# Patient Record
Sex: FEMALE | Race: OTHER | HISPANIC OR LATINO
[De-identification: names, ages, dates, MRNs, and addresses within clinical notes are randomized per-mention and may not be internally consistent; named-entity substitution may affect disease eponyms.]

---

## 2019-01-19 PROBLEM — Z00.00 ENCOUNTER FOR PREVENTIVE HEALTH EXAMINATION: Status: ACTIVE | Noted: 2019-01-19

## 2019-02-04 ENCOUNTER — LABORATORY RESULT (OUTPATIENT)
Age: 79
End: 2019-02-04

## 2019-02-05 ENCOUNTER — APPOINTMENT (OUTPATIENT)
Dept: OBGYN | Facility: CLINIC | Age: 79
End: 2019-02-05
Payer: MEDICARE

## 2019-02-05 VITALS — DIASTOLIC BLOOD PRESSURE: 80 MMHG | WEIGHT: 147 LBS | SYSTOLIC BLOOD PRESSURE: 130 MMHG

## 2019-02-05 DIAGNOSIS — I10 ESSENTIAL (PRIMARY) HYPERTENSION: ICD-10-CM

## 2019-02-05 DIAGNOSIS — M19.90 UNSPECIFIED OSTEOARTHRITIS, UNSPECIFIED SITE: ICD-10-CM

## 2019-02-05 PROCEDURE — 99202 OFFICE O/P NEW SF 15 MIN: CPT | Mod: 25

## 2019-02-05 PROCEDURE — 99387 INIT PM E/M NEW PAT 65+ YRS: CPT

## 2019-02-05 NOTE — PHYSICAL EXAM
[Normal] : urethra [No Bleeding] : there was no active vaginal bleeding [Absent] : absent [Uterine Adnexae] : were not tender and not enlarged [FreeTextEntry4] : vaginal prolapse

## 2019-02-05 NOTE — COUNSELING
[Breast Self Exam] : breast self exam [Nutrition] : nutrition [Exercise] : exercise [Vitamins/Supplements] : vitamins/supplements [STD (testing, results, tx)] : STD (testing, results, tx) [Vaccines] : vaccines

## 2019-02-27 ENCOUNTER — APPOINTMENT (OUTPATIENT)
Dept: OBGYN | Facility: CLINIC | Age: 79
End: 2019-02-27

## 2019-09-09 ENCOUNTER — APPOINTMENT (OUTPATIENT)
Dept: UROLOGY | Facility: CLINIC | Age: 79
End: 2019-09-09
Payer: MEDICARE

## 2019-09-09 VITALS
DIASTOLIC BLOOD PRESSURE: 85 MMHG | HEIGHT: 64.5 IN | RESPIRATION RATE: 16 BRPM | WEIGHT: 153 LBS | HEART RATE: 83 BPM | BODY MASS INDEX: 25.8 KG/M2 | SYSTOLIC BLOOD PRESSURE: 145 MMHG

## 2019-09-09 PROCEDURE — 99204 OFFICE O/P NEW MOD 45 MIN: CPT

## 2019-09-09 NOTE — REVIEW OF SYSTEMS
[see HPI] : see HPI [Blood in urine that you can see] : blood visible in urine [Urine Infection (bladder/kidney)] : bladder/kidney infection [Negative] : Heme/Lymph

## 2019-09-11 RX ORDER — GLIMEPIRIDE 4 MG/1
TABLET ORAL
Refills: 0 | Status: ACTIVE | COMMUNITY

## 2019-09-11 NOTE — HISTORY OF PRESENT ILLNESS
[FreeTextEntry1] : 80 yo F presents with history of gross hematuria a few days ago\par Was also having some urinary frequency\par Went to ER and given abx = macrobid\par no significant LUTS anymore\par no dysuria\par no recurrent UTI\par history of prolapse s/p surgery in 2015 with urogyn\par

## 2019-09-11 NOTE — ASSESSMENT
[FreeTextEntry1] : 80 yo F with gross hematuria, possible UTI\par \par - UA, culture\par - Discussed possible etiologies for hematuria including benign (UTI, nephrolithiasis, cyst, BPH) vs malignancy (renal, ureteral and bladder). Discussed hematuria workup which includes CT urogram and cystoscopy. Discussed risk and benefits of cystoscopy.\par - For now, will proceed with CT urogram and consider cysto once results reviewed\par

## 2019-09-12 LAB
APPEARANCE: ABNORMAL
BACTERIA UR CULT: ABNORMAL
BACTERIA: ABNORMAL
BILIRUBIN URINE: NEGATIVE
BLOOD URINE: ABNORMAL
COLOR: YELLOW
GLUCOSE QUALITATIVE U: NEGATIVE
HYALINE CASTS: 0 /LPF
KETONES URINE: NEGATIVE
LEUKOCYTE ESTERASE URINE: ABNORMAL
MICROSCOPIC-UA: NORMAL
NITRITE URINE: POSITIVE
PH URINE: 5.5
PROTEIN URINE: NORMAL
RED BLOOD CELLS URINE: 74 /HPF
SPECIFIC GRAVITY URINE: 1.02
SQUAMOUS EPITHELIAL CELLS: 1 /HPF
UROBILINOGEN URINE: NORMAL
WHITE BLOOD CELLS URINE: 167 /HPF

## 2019-09-12 RX ORDER — CIPROFLOXACIN HYDROCHLORIDE 500 MG/1
500 TABLET, FILM COATED ORAL
Qty: 14 | Refills: 0 | Status: ACTIVE | COMMUNITY
Start: 2019-09-12 | End: 1900-01-01

## 2019-09-13 ENCOUNTER — RESULT REVIEW (OUTPATIENT)
Age: 79
End: 2019-09-13

## 2019-10-01 ENCOUNTER — FORM ENCOUNTER (OUTPATIENT)
Age: 79
End: 2019-10-01

## 2019-10-02 ENCOUNTER — OUTPATIENT (OUTPATIENT)
Dept: OUTPATIENT SERVICES | Facility: HOSPITAL | Age: 79
LOS: 1 days | End: 2019-10-02
Payer: COMMERCIAL

## 2019-10-02 ENCOUNTER — APPOINTMENT (OUTPATIENT)
Dept: CT IMAGING | Facility: HOSPITAL | Age: 79
End: 2019-10-02
Payer: MEDICARE

## 2019-10-02 DIAGNOSIS — R31.0 GROSS HEMATURIA: ICD-10-CM

## 2019-10-02 PROCEDURE — 74178 CT ABD&PLV WO CNTR FLWD CNTR: CPT | Mod: 26

## 2019-10-02 PROCEDURE — 74178 CT ABD&PLV WO CNTR FLWD CNTR: CPT

## 2020-01-13 ENCOUNTER — APPOINTMENT (OUTPATIENT)
Dept: UROLOGY | Facility: CLINIC | Age: 80
End: 2020-01-13
Payer: MEDICARE

## 2020-01-13 VITALS
HEART RATE: 85 BPM | WEIGHT: 153 LBS | HEIGHT: 64 IN | DIASTOLIC BLOOD PRESSURE: 86 MMHG | SYSTOLIC BLOOD PRESSURE: 136 MMHG | BODY MASS INDEX: 26.12 KG/M2

## 2020-01-13 DIAGNOSIS — N39.0 URINARY TRACT INFECTION, SITE NOT SPECIFIED: ICD-10-CM

## 2020-01-13 PROCEDURE — 99213 OFFICE O/P EST LOW 20 MIN: CPT

## 2020-01-19 NOTE — ASSESSMENT
[FreeTextEntry1] : 78 yo F with history of recurrent UTI, LUTS history. Doing well\par \par - Reaffirmed behavioral modifications\par - FU in 6 months\par \par

## 2020-01-19 NOTE — PHYSICAL EXAM
[General Appearance - Well Nourished] : well nourished [General Appearance - Well Developed] : well developed [Normal Appearance] : normal appearance [General Appearance - In No Acute Distress] : no acute distress [Well Groomed] : well groomed [Urinary Bladder Findings] : the bladder was normal on palpation [Costovertebral Angle Tenderness] : no ~M costovertebral angle tenderness [Abdomen Tenderness] : non-tender [Abdomen Soft] : soft [Edema] : no peripheral edema [Exaggerated Use Of Accessory Muscles For Inspiration] : no accessory muscle use [] : no respiratory distress [Respiration, Rhythm And Depth] : normal respiratory rhythm and effort [Mood] : the mood was normal [Affect] : the affect was normal [Oriented To Time, Place, And Person] : oriented to person, place, and time [Normal Station and Gait] : the gait and station were normal for the patient's age [Not Anxious] : not anxious [No Focal Deficits] : no focal deficits [No Palpable Adenopathy] : no palpable adenopathy

## 2020-01-19 NOTE — HISTORY OF PRESENT ILLNESS
[FreeTextEntry1] : 80 yo F presents with history of gross hematuria a few days ago\par Was also having some urinary frequency\par Went to ER and given abx = macrobid\par no significant LUTS anymore\par no dysuria\par no recurrent UTI\par history of prolapse s/p surgery in 2015 with urogyn\par \par 1/13/20 Interval history: CT urogram done since last visit did show a lobulated kidney with scarring, likely secondary to prior UTI\par Of note, had cysto done in June with outside urologist (Dr. Bauer) and was told normal\par Found to have UTI at last visit\par Overall feeling well with no issues since last visit in Sept\par Taking myrbetriq for the last year prescribed by PCP\par Recent UA - shows pyuria, hematuria\par Drinks 2-3 bottles of water daily\par no incontinence - no pads or diapers\par \par

## 2020-07-27 ENCOUNTER — APPOINTMENT (OUTPATIENT)
Age: 80
End: 2020-07-27
Payer: MEDICARE

## 2020-07-27 VITALS — SYSTOLIC BLOOD PRESSURE: 134 MMHG | TEMPERATURE: 97.8 F | HEART RATE: 85 BPM | DIASTOLIC BLOOD PRESSURE: 78 MMHG

## 2020-07-27 PROCEDURE — 99214 OFFICE O/P EST MOD 30 MIN: CPT | Mod: 25

## 2020-07-27 PROCEDURE — 76705 ECHO EXAM OF ABDOMEN: CPT

## 2020-08-02 NOTE — PHYSICAL EXAM
[General Appearance - Well Developed] : well developed [General Appearance - Well Nourished] : well nourished [Normal Appearance] : normal appearance [General Appearance - In No Acute Distress] : no acute distress [Abdomen Soft] : soft [Well Groomed] : well groomed [Urinary Bladder Findings] : the bladder was normal on palpation [Abdomen Tenderness] : non-tender [Costovertebral Angle Tenderness] : no ~M costovertebral angle tenderness [] : no respiratory distress [Edema] : no peripheral edema [Oriented To Time, Place, And Person] : oriented to person, place, and time [Respiration, Rhythm And Depth] : normal respiratory rhythm and effort [Exaggerated Use Of Accessory Muscles For Inspiration] : no accessory muscle use [Not Anxious] : not anxious [Affect] : the affect was normal [Mood] : the mood was normal [No Palpable Adenopathy] : no palpable adenopathy [No Focal Deficits] : no focal deficits [Normal Station and Gait] : the gait and station were normal for the patient's age

## 2020-08-02 NOTE — HISTORY OF PRESENT ILLNESS
[FreeTextEntry1] : 78 yo F presents with history of gross hematuria a few days ago\par Was also having some urinary frequency\par Went to ER and given abx = macrobid\par no significant LUTS anymore\par no dysuria\par no recurrent UTI\par history of prolapse s/p surgery in 2015 with urogyn\par \par 1/13/20 Interval history: CT urogram done since last visit did show a lobulated kidney with scarring, likely secondary to prior UTI\par Of note, had cysto done in June with outside urologist (Dr. Bauer) and was told normal\par Found to have UTI at last visit\par Overall feeling well with no issues since last visit in Sept\par Taking myrbetriq for the last year prescribed by PCP\par Recent UA - shows pyuria, hematuria\par Drinks 2-3 bottles of water daily\par no incontinence - no pads or diapers\par \par 7/27/20 Interval history: In March had COVID\par In May, PCP did UA which showed crystalluria and microhematuria\par Of note, had CT urogram in Oct which showed some scarring but no stones\par Drinking 8-10 glasses of water\par Overall feeling well from  standpoint - no dysuria, no gross hematuria

## 2020-08-02 NOTE — ASSESSMENT
[FreeTextEntry1] : 81 yo F with history of recurrent UTI, LUTS. Recent microhematuria, crystalluria\par \par - Renal US today showed lobulated kidney, multiple left renal cysts but no hydronpherosis and no stones\par - UA, culture\par - Reaffirmed importance of adequate hydration, timed voiding\par - Should UA show persistent microhematuria, would recommend repeat cysto\par

## 2020-08-03 LAB
APPEARANCE: CLEAR
BACTERIA UR CULT: NORMAL
BACTERIA: NEGATIVE
BILIRUBIN URINE: NEGATIVE
BLOOD URINE: ABNORMAL
COLOR: NORMAL
GLUCOSE QUALITATIVE U: NEGATIVE
HYALINE CASTS: 0 /LPF
KETONES URINE: NEGATIVE
LEUKOCYTE ESTERASE URINE: NEGATIVE
MICROSCOPIC-UA: NORMAL
NITRITE URINE: NEGATIVE
PH URINE: 6
PROTEIN URINE: NORMAL
RED BLOOD CELLS URINE: 45 /HPF
SPECIFIC GRAVITY URINE: 1.02
SQUAMOUS EPITHELIAL CELLS: 1 /HPF
UROBILINOGEN URINE: NORMAL
WHITE BLOOD CELLS URINE: 1 /HPF

## 2020-08-13 ENCOUNTER — APPOINTMENT (OUTPATIENT)
Dept: UROLOGY | Facility: CLINIC | Age: 80
End: 2020-08-13
Payer: MEDICARE

## 2020-08-13 VITALS
SYSTOLIC BLOOD PRESSURE: 156 MMHG | DIASTOLIC BLOOD PRESSURE: 87 MMHG | BODY MASS INDEX: 26.12 KG/M2 | WEIGHT: 153 LBS | TEMPERATURE: 97.9 F | HEIGHT: 64 IN | HEART RATE: 79 BPM

## 2020-08-13 PROCEDURE — 52000 CYSTOURETHROSCOPY: CPT

## 2020-12-23 PROBLEM — N39.0 ACUTE UTI: Status: RESOLVED | Noted: 2019-09-12 | Resolved: 2020-12-23

## 2021-01-28 ENCOUNTER — APPOINTMENT (OUTPATIENT)
Age: 81
End: 2021-01-28
Payer: MEDICARE

## 2021-01-28 VITALS
BODY MASS INDEX: 26.12 KG/M2 | SYSTOLIC BLOOD PRESSURE: 153 MMHG | WEIGHT: 153 LBS | HEIGHT: 64 IN | TEMPERATURE: 97.3 F | DIASTOLIC BLOOD PRESSURE: 68 MMHG | HEART RATE: 80 BPM

## 2021-01-28 PROCEDURE — 99213 OFFICE O/P EST LOW 20 MIN: CPT

## 2021-01-28 PROCEDURE — 99072 ADDL SUPL MATRL&STAF TM PHE: CPT

## 2021-01-28 NOTE — ASSESSMENT
[FreeTextEntry1] : 79 yo F with LUTS, abnormal UA\par \par - Reviewed labwork done by PCP in Dec which again showed some pyuria, microhematuria. No culture done.\par - Discussed possible etiologies for LUTS. Discussed ways to manage them including behavioral modifications such as adequate hydration, controlling constipation, restricting fluids in the evening\par - FU in 6 months

## 2021-02-03 LAB
APPEARANCE: ABNORMAL
BACTERIA UR CULT: NORMAL
BACTERIA: ABNORMAL
BILIRUBIN URINE: NEGATIVE
BLOOD URINE: ABNORMAL
COLOR: NORMAL
GLUCOSE QUALITATIVE U: NEGATIVE
HYALINE CASTS: 0 /LPF
KETONES URINE: NEGATIVE
LEUKOCYTE ESTERASE URINE: ABNORMAL
MICROSCOPIC-UA: NORMAL
NITRITE URINE: NEGATIVE
PH URINE: 6
PROTEIN URINE: NORMAL
RED BLOOD CELLS URINE: 42 /HPF
SPECIFIC GRAVITY URINE: 1.02
SQUAMOUS EPITHELIAL CELLS: 4 /HPF
UROBILINOGEN URINE: NORMAL
WHITE BLOOD CELLS URINE: 127 /HPF

## 2021-07-29 ENCOUNTER — APPOINTMENT (OUTPATIENT)
Age: 81
End: 2021-07-29

## 2021-08-09 ENCOUNTER — APPOINTMENT (OUTPATIENT)
Dept: UROLOGY | Facility: CLINIC | Age: 81
End: 2021-08-09
Payer: MEDICARE

## 2021-08-09 VITALS
HEIGHT: 64 IN | HEART RATE: 82 BPM | TEMPERATURE: 98.2 F | BODY MASS INDEX: 25.61 KG/M2 | SYSTOLIC BLOOD PRESSURE: 150 MMHG | WEIGHT: 150 LBS | DIASTOLIC BLOOD PRESSURE: 70 MMHG

## 2021-08-09 PROCEDURE — 51701 INSERT BLADDER CATHETER: CPT

## 2021-08-09 PROCEDURE — 99214 OFFICE O/P EST MOD 30 MIN: CPT | Mod: 25

## 2021-08-15 NOTE — ASSESSMENT
[FreeTextEntry1] : 82 yo F with LUTS, urgency with incontinence\par \par - PVR = 10ml\par - Catheterized specimen obtained for UA and culture\par - Discussed possible etiologies for LUTS. Discussed ways to manage them including behavioral modifications such as adequate hydration, controlling constipation, restricting fluids in the evening\par - Discussed the pros and cons of adding solifenacin to myrbetriq. Rx transmitted\par - FU in 1 month

## 2021-08-15 NOTE — HISTORY OF PRESENT ILLNESS
[FreeTextEntry1] : 80 yo F presents with history of gross hematuria a few days ago\par Was also having some urinary frequency\par Went to ER and given abx = macrobid\par no significant LUTS anymore\par no dysuria\par no recurrent UTI\par history of prolapse s/p surgery in 2015 with urogyn\par \par 1/13/20 Interval history: CT urogram done since last visit did show a lobulated kidney with scarring, likely secondary to prior UTI\par Of note, had cysto done in June with outside urologist (Dr. Bauer) and was told normal\par Found to have UTI at last visit\par Overall feeling well with no issues since last visit in Sept\par Taking myrbetriq for the last year prescribed by PCP\par Recent UA - shows pyuria, hematuria\par Drinks 2-3 bottles of water daily\par no incontinence - no pads or diapers\par \par 7/27/20 Interval history: In March had COVID\par In May, PCP did UA which showed crystalluria and microhematuria\par Of note, had CT urogram in Oct which showed some scarring but no stones\par Drinking 8-10 glasses of water\par Overall feeling well from  standpoint - no dysuria, no gross hematuria\par \par 1/28/21 Interval history: UA in Dec - pyuria, microhematuria\par sometimes dysuria but temporary\par Otherwise no symptoms\par No gross hematuria\par does have some nocturia - currently on myrbetriq\par Of note, cysto and Ultrasound done in July and August 2020 was unremarkable\par \par 8/9/21 Interval history: Complaining of nocturia 4-5/times\par Increased urgency associated with some incontinence\par During the day only voiding every 2-3 hours with no issues\par Last visit, urine culture was contaminated\par

## 2021-08-19 LAB
APPEARANCE: ABNORMAL
BACTERIA UR CULT: ABNORMAL
BACTERIA: NEGATIVE
BILIRUBIN URINE: NEGATIVE
BLOOD URINE: ABNORMAL
COLOR: NORMAL
GLUCOSE QUALITATIVE U: NEGATIVE
HYALINE CASTS: 0 /LPF
KETONES URINE: NEGATIVE
LEUKOCYTE ESTERASE URINE: ABNORMAL
MICROSCOPIC-UA: NORMAL
NITRITE URINE: NEGATIVE
PH URINE: 6
PROTEIN URINE: NORMAL
RED BLOOD CELLS URINE: 13 /HPF
SPECIFIC GRAVITY URINE: 1.01
SQUAMOUS EPITHELIAL CELLS: 1 /HPF
UROBILINOGEN URINE: NORMAL
WHITE BLOOD CELLS URINE: 148 /HPF

## 2021-09-16 ENCOUNTER — APPOINTMENT (OUTPATIENT)
Dept: UROLOGY | Facility: CLINIC | Age: 81
End: 2021-09-16

## 2021-09-30 ENCOUNTER — APPOINTMENT (OUTPATIENT)
Age: 81
End: 2021-09-30
Payer: MEDICARE

## 2021-09-30 PROCEDURE — 99213 OFFICE O/P EST LOW 20 MIN: CPT

## 2021-10-10 NOTE — HISTORY OF PRESENT ILLNESS
[FreeTextEntry1] : 80 yo F presents with history of gross hematuria a few days ago\par Was also having some urinary frequency\par Went to ER and given abx = macrobid\par no significant LUTS anymore\par no dysuria\par no recurrent UTI\par history of prolapse s/p surgery in 2015 with urogyn\par \par 1/13/20 Interval history: CT urogram done since last visit did show a lobulated kidney with scarring, likely secondary to prior UTI\par Of note, had cysto done in June with outside urologist (Dr. Bauer) and was told normal\par Found to have UTI at last visit\par Overall feeling well with no issues since last visit in Sept\par Taking myrbetriq for the last year prescribed by PCP\par Recent UA - shows pyuria, hematuria\par Drinks 2-3 bottles of water daily\par no incontinence - no pads or diapers\par \par 7/27/20 Interval history: In March had COVID\par In May, PCP did UA which showed crystalluria and microhematuria\par Of note, had CT urogram in Oct which showed some scarring but no stones\par Drinking 8-10 glasses of water\par Overall feeling well from  standpoint - no dysuria, no gross hematuria\par \par 1/28/21 Interval history: UA in Dec - pyuria, microhematuria\par sometimes dysuria but temporary\par Otherwise no symptoms\par No gross hematuria\par does have some nocturia - currently on myrbetriq\par Of note, cysto and Ultrasound done in July and August 2020 was unremarkable\par \par 8/9/21 Interval history: Complaining of nocturia 4-5/times\par Increased urgency associated with some incontinence\par During the day only voiding every 2-3 hours with no issues\par Last visit, urine culture was contaminated\par \par 9/30/21 Interval history: started solifenacin at last visit\par nocturia down to 2/night\par feeling better\par

## 2021-10-10 NOTE — ASSESSMENT
[FreeTextEntry1] : 80 yo F with LUTS and incontinence\par \par - PVR = 0ml\par - Continue solifenacin\par - Reaffirmed behavioral modifications\par - FU in 3 months

## 2021-12-20 ENCOUNTER — APPOINTMENT (OUTPATIENT)
Dept: UROLOGY | Facility: CLINIC | Age: 81
End: 2021-12-20
Payer: MEDICARE

## 2021-12-20 PROCEDURE — 99213 OFFICE O/P EST LOW 20 MIN: CPT

## 2021-12-28 NOTE — HISTORY OF PRESENT ILLNESS
[FreeTextEntry1] : 80 yo F presents with history of gross hematuria a few days ago\par Was also having some urinary frequency\par Went to ER and given abx = macrobid\par no significant LUTS anymore\par no dysuria\par no recurrent UTI\par history of prolapse s/p surgery in 2015 with urogyn\par \par 1/13/20 Interval history: CT urogram done since last visit did show a lobulated kidney with scarring, likely secondary to prior UTI\par Of note, had cysto done in June with outside urologist (Dr. Bauer) and was told normal\par Found to have UTI at last visit\par Overall feeling well with no issues since last visit in Sept\par Taking myrbetriq for the last year prescribed by PCP\par Recent UA - shows pyuria, hematuria\par Drinks 2-3 bottles of water daily\par no incontinence - no pads or diapers\par \par 7/27/20 Interval history: In March had COVID\par In May, PCP did UA which showed crystalluria and microhematuria\par Of note, had CT urogram in Oct which showed some scarring but no stones\par Drinking 8-10 glasses of water\par Overall feeling well from  standpoint - no dysuria, no gross hematuria\par \par 1/28/21 Interval history: UA in Dec - pyuria, microhematuria\par sometimes dysuria but temporary\par Otherwise no symptoms\par No gross hematuria\par does have some nocturia - currently on myrbetriq\par Of note, cysto and Ultrasound done in July and August 2020 was unremarkable\par \par 8/9/21 Interval history: Complaining of nocturia 4-5/times\par Increased urgency associated with some incontinence\par During the day only voiding every 2-3 hours with no issues\par Last visit, urine culture was contaminated\par \par 9/30/21 Interval history: started solifenacin at last visit\par nocturia down to 2/night\par feeling better\par \par 12/20/21 - UA done in Oct with pCP - hematuria, pyuria\par no gross hematuria\par Feeling good today, nocturia 1-2/night\par Still taking myrbetriq and solifenacin

## 2021-12-28 NOTE — ASSESSMENT
[FreeTextEntry1] : 80 yo F with LUTS, abnormal UA\par \par - PVR = 0ml\par - Continue anticholinergics\par - For now, continue observation given no symptoms\par - FU in 6 months

## 2022-06-30 ENCOUNTER — APPOINTMENT (OUTPATIENT)
Age: 82
End: 2022-06-30

## 2022-06-30 VITALS
TEMPERATURE: 97 F | BODY MASS INDEX: 25.95 KG/M2 | WEIGHT: 152 LBS | DIASTOLIC BLOOD PRESSURE: 75 MMHG | HEART RATE: 75 BPM | OXYGEN SATURATION: 99 % | HEIGHT: 64 IN | SYSTOLIC BLOOD PRESSURE: 125 MMHG

## 2022-06-30 DIAGNOSIS — R82.90 UNSPECIFIED ABNORMAL FINDINGS IN URINE: ICD-10-CM

## 2022-06-30 DIAGNOSIS — R82.998 OTHER ABNORMAL FINDINGS IN URINE: ICD-10-CM

## 2022-06-30 DIAGNOSIS — Z87.448 PERSONAL HISTORY OF OTHER DISEASES OF URINARY SYSTEM: ICD-10-CM

## 2022-06-30 PROCEDURE — 99214 OFFICE O/P EST MOD 30 MIN: CPT

## 2022-06-30 RX ORDER — SOLIFENACIN SUCCINATE 5 MG/1
5 TABLET ORAL
Qty: 90 | Refills: 3 | Status: COMPLETED | COMMUNITY
Start: 2021-08-09 | End: 2022-06-30

## 2022-06-30 NOTE — HISTORY OF PRESENT ILLNESS
[FreeTextEntry1] : 80 yo F presents with history of gross hematuria a few days ago\par Was also having some urinary frequency\par Went to ER and given abx = macrobid\par no significant LUTS anymore\par no dysuria\par no recurrent UTI\par history of prolapse s/p surgery in 2015 with urogyn\par \par 1/13/20 Interval history: CT urogram done since last visit did show a lobulated kidney with scarring, likely secondary to prior UTI\par Of note, had cysto done in June with outside urologist (Dr. Bauer) and was told normal\par Found to have UTI at last visit\par Overall feeling well with no issues since last visit in Sept\par Taking myrbetriq for the last year prescribed by PCP\par Recent UA - shows pyuria, hematuria\par Drinks 2-3 bottles of water daily\par no incontinence - no pads or diapers\par \par 7/27/20 Interval history: In March had COVID\par In May, PCP did UA which showed crystalluria and microhematuria\par Of note, had CT urogram in Oct which showed some scarring but no stones\par Drinking 8-10 glasses of water\par Overall feeling well from  standpoint - no dysuria, no gross hematuria\par \par 1/28/21 Interval history: UA in Dec - pyuria, microhematuria\par sometimes dysuria but temporary\par Otherwise no symptoms\par No gross hematuria\par does have some nocturia - currently on myrbetriq\par Of note, cysto and Ultrasound done in July and August 2020 was unremarkable\par \par 8/9/21 Interval history: Complaining of nocturia 4-5/times\par Increased urgency associated with some incontinence\par During the day only voiding every 2-3 hours with no issues\par Last visit, urine culture was contaminated\par \par 9/30/21 Interval history: started solifenacin at last visit\par nocturia down to 2/night\par feeling better\par \par 12/20/21 - UA done in Oct with pCP - hematuria, pyuria\par no gross hematuria\par Feeling good today, nocturia 1-2/night\par Still taking myrbetriq and solifenacin\par \par 6/30/22 Interval history: Stable LUTs since last visit\par No UTIs\par No incontinence\par nocturia 1-2/night\par Drinks 2-3 bottles of water\par Recent UA done by PCP showed microhematuria and pyuria\par Of note, prior hematuria workup in 2020 was normal\par

## 2022-06-30 NOTE — PHYSICAL EXAM

## 2022-06-30 NOTE — ASSESSMENT
[FreeTextEntry1] : 81 yo F with LUTS, vaginal prolapse, abnormal UA\par \par - PVR = 139ml\par - REviewed UA done by PCP and confirmed findings as stated above\par - Dsicussed potential etiologies for findings, - likely secondary to prolapse\par - Discussed options for her prolapse. Pt wants to continue observation\par - Given incomplete bladder emptying, stop solifenacin. OK to continue myrbetriq. Also reviewed with pt reducing prolapse for further bladder emptying\par - FU in 3 months

## 2022-10-03 ENCOUNTER — LABORATORY RESULT (OUTPATIENT)
Age: 82
End: 2022-10-03

## 2022-10-03 ENCOUNTER — APPOINTMENT (OUTPATIENT)
Age: 82
End: 2022-10-03

## 2022-10-03 VITALS
DIASTOLIC BLOOD PRESSURE: 81 MMHG | SYSTOLIC BLOOD PRESSURE: 153 MMHG | BODY MASS INDEX: 25.61 KG/M2 | HEIGHT: 64 IN | WEIGHT: 150 LBS | TEMPERATURE: 96.7 F | HEART RATE: 70 BPM

## 2022-10-03 VITALS
DIASTOLIC BLOOD PRESSURE: 81 MMHG | TEMPERATURE: 96.7 F | WEIGHT: 150 LBS | HEIGHT: 64 IN | SYSTOLIC BLOOD PRESSURE: 153 MMHG | BODY MASS INDEX: 25.61 KG/M2

## 2022-10-03 PROCEDURE — 99214 OFFICE O/P EST MOD 30 MIN: CPT

## 2022-10-07 LAB
APPEARANCE: ABNORMAL
BILIRUBIN URINE: NEGATIVE
BLOOD URINE: ABNORMAL
COLOR: NORMAL
GLUCOSE QUALITATIVE U: NEGATIVE
KETONES URINE: NEGATIVE
LEUKOCYTE ESTERASE URINE: ABNORMAL
NITRITE URINE: NEGATIVE
PH URINE: 6
PROTEIN URINE: NORMAL
SPECIFIC GRAVITY URINE: 1.01
UROBILINOGEN URINE: NORMAL

## 2022-10-11 NOTE — PHYSICAL EXAM
[General Appearance - Well Developed] : well developed [General Appearance - Well Nourished] : well nourished [Normal Appearance] : normal appearance [Well Groomed] : well groomed [General Appearance - In No Acute Distress] : no acute distress [Abdomen Soft] : soft [Abdomen Tenderness] : non-tender [Costovertebral Angle Tenderness] : no ~M costovertebral angle tenderness [Urethral Meatus] : normal urethra [Urinary Bladder Findings] : the bladder was normal on palpation [External Female Genitalia] : normal external genitalia [Edema] : no peripheral edema [] : no respiratory distress [Respiration, Rhythm And Depth] : normal respiratory rhythm and effort [Exaggerated Use Of Accessory Muscles For Inspiration] : no accessory muscle use [Oriented To Time, Place, And Person] : oriented to person, place, and time [Affect] : the affect was normal [Mood] : the mood was normal [Not Anxious] : not anxious [Normal Station and Gait] : the gait and station were normal for the patient's age [No Focal Deficits] : no focal deficits [No Palpable Adenopathy] : no palpable adenopathy [FreeTextEntry1] : severe prolapse - deferred today

## 2022-10-11 NOTE — ASSESSMENT
[FreeTextEntry1] : 81 yo F with LUTS, prolapse\par \par - PVR = 300ml\par - Stop Myrbetriq\par - Discussed how her prolapse is likely contributing to her symptoms. Reviewed options but pt doesn't want to proceed with any sort of intervention at this time\par - FU in 1 month

## 2022-10-11 NOTE — HISTORY OF PRESENT ILLNESS
[FreeTextEntry1] : 78 yo F presents with history of gross hematuria a few days ago\par Was also having some urinary frequency\par Went to ER and given abx = macrobid\par no significant LUTS anymore\par no dysuria\par no recurrent UTI\par history of prolapse s/p surgery in 2015 with urogyn\par \par 1/13/20 Interval history: CT urogram done since last visit did show a lobulated kidney with scarring, likely secondary to prior UTI\par Of note, had cysto done in June with outside urologist (Dr. Bauer) and was told normal\par Found to have UTI at last visit\par Overall feeling well with no issues since last visit in Sept\par Taking myrbetriq for the last year prescribed by PCP\par Recent UA - shows pyuria, hematuria\par Drinks 2-3 bottles of water daily\par no incontinence - no pads or diapers\par \par 7/27/20 Interval history: In March had COVID\par In May, PCP did UA which showed crystalluria and microhematuria\par Of note, had CT urogram in Oct which showed some scarring but no stones\par Drinking 8-10 glasses of water\par Overall feeling well from  standpoint - no dysuria, no gross hematuria\par \par 1/28/21 Interval history: UA in Dec - pyuria, microhematuria\par sometimes dysuria but temporary\par Otherwise no symptoms\par No gross hematuria\par does have some nocturia - currently on myrbetriq\par Of note, cysto and Ultrasound done in July and August 2020 was unremarkable\par \par 8/9/21 Interval history: Complaining of nocturia 4-5/times\par Increased urgency associated with some incontinence\par During the day only voiding every 2-3 hours with no issues\par Last visit, urine culture was contaminated\par \par 9/30/21 Interval history: started solifenacin at last visit\par nocturia down to 2/night\par feeling better\par \par 12/20/21 - UA done in Oct with pCP - hematuria, pyuria\par no gross hematuria\par Feeling good today, nocturia 1-2/night\par Still taking myrbetriq and solifenacin\par \par 6/30/22 Interval history: Stable LUTs since last visit\par No UTIs\par No incontinence\par nocturia 1-2/night\par Drinks 2-3 bottles of water\par Recent UA done by PCP showed microhematuria and pyuria\par Of note, prior hematuria workup in 2020 was normal\par \par 10/3/22 Interval history: Presents with some worsening of  nocturia 3/night - during the day, no issues\par Stopped solifenacin after last visit and still taking myrbetriq\par At last visit, pt found to have worsening prolapse\par of note, states she had some sort of bladder procedure in 2015

## 2022-11-03 ENCOUNTER — APPOINTMENT (OUTPATIENT)
Age: 82
End: 2022-11-03

## 2022-11-03 VITALS
SYSTOLIC BLOOD PRESSURE: 141 MMHG | WEIGHT: 145 LBS | TEMPERATURE: 97.6 F | HEART RATE: 72 BPM | BODY MASS INDEX: 24.75 KG/M2 | DIASTOLIC BLOOD PRESSURE: 76 MMHG | OXYGEN SATURATION: 99 % | HEIGHT: 64 IN

## 2022-11-03 DIAGNOSIS — N81.10 CYSTOCELE, UNSPECIFIED: ICD-10-CM

## 2022-11-03 DIAGNOSIS — R39.9 UNSPECIFIED SYMPTOMS AND SIGNS INVOLVING THE GENITOURINARY SYSTEM: ICD-10-CM

## 2022-11-03 PROCEDURE — 99214 OFFICE O/P EST MOD 30 MIN: CPT

## 2022-11-03 NOTE — ASSESSMENT
[FreeTextEntry1] : 83 yo F with history of prolapse s/p repair x2 with recurrent prolapse and worsening LUTS\par \par - PVR = 260ml\par - Reviewed options with pt and her son again. Given that symptoms are worsening, pt amenable to intervention at this time. Will arrange for follow-up with Dr. Ling for pessary vs surgical intervention\par - In the mean time, reaffirmed behavioral modifications and instructed pt on manual reduction of prolapse at the time of urination to help with voiding.

## 2022-11-03 NOTE — HISTORY OF PRESENT ILLNESS
[FreeTextEntry1] : 80 yo F presents with history of gross hematuria a few days ago\par Was also having some urinary frequency\par Went to ER and given abx = macrobid\par no significant LUTS anymore\par no dysuria\par no recurrent UTI\par history of prolapse s/p surgery in 2015 with urogyn\par \par 1/13/20 Interval history: CT urogram done since last visit did show a lobulated kidney with scarring, likely secondary to prior UTI\par Of note, had cysto done in June with outside urologist (Dr. Bauer) and was told normal\par Found to have UTI at last visit\par Overall feeling well with no issues since last visit in Sept\par Taking myrbetriq for the last year prescribed by PCP\par Recent UA - shows pyuria, hematuria\par Drinks 2-3 bottles of water daily\par no incontinence - no pads or diapers\par \par 7/27/20 Interval history: In March had COVID\par In May, PCP did UA which showed crystalluria and microhematuria\par Of note, had CT urogram in Oct which showed some scarring but no stones\par Drinking 8-10 glasses of water\par Overall feeling well from  standpoint - no dysuria, no gross hematuria\par \par 1/28/21 Interval history: UA in Dec - pyuria, microhematuria\par sometimes dysuria but temporary\par Otherwise no symptoms\par No gross hematuria\par does have some nocturia - currently on myrbetriq\par Of note, cysto and Ultrasound done in July and August 2020 was unremarkable\par \par 8/9/21 Interval history: Complaining of nocturia 4-5/times\par Increased urgency associated with some incontinence\par During the day only voiding every 2-3 hours with no issues\par Last visit, urine culture was contaminated\par \par 9/30/21 Interval history: started solifenacin at last visit\par nocturia down to 2/night\par feeling better\par \par 12/20/21 - UA done in Oct with pCP - hematuria, pyuria\par no gross hematuria\par Feeling good today, nocturia 1-2/night\par Still taking myrbetriq and solifenacin\par \par 6/30/22 Interval history: Stable LUTs since last visit\par No UTIs\par No incontinence\par nocturia 1-2/night\par Drinks 2-3 bottles of water\par Recent UA done by PCP showed microhematuria and pyuria\par Of note, prior hematuria workup in 2020 was normal\par \par 10/3/22 Interval history: Presents with some worsening of  nocturia 3/night - during the day, no issues\par Stopped solifenacin after last visit and still taking myrbetriq\par At last visit, pt found to have worsening prolapse\par of note, states she had some sort of bladder procedure for her prolapse in 2015\par \par 11/3/22 Interval history: Stopped myrbetriq at last visit also due to incompelte bladder emptying\par PCP did another urine test in the interval - unremarkable\par Still having bothersome LUTS

## 2022-11-03 NOTE — PHYSICAL EXAM
[General Appearance - Well Developed] : well developed [General Appearance - Well Nourished] : well nourished [Normal Appearance] : normal appearance [Well Groomed] : well groomed [General Appearance - In No Acute Distress] : no acute distress [Abdomen Soft] : soft [Abdomen Tenderness] : non-tender [Costovertebral Angle Tenderness] : no ~M costovertebral angle tenderness [Urethral Meatus] : normal urethra [Urinary Bladder Findings] : the bladder was normal on palpation [External Female Genitalia] : normal external genitalia [Edema] : no peripheral edema [] : no respiratory distress [Respiration, Rhythm And Depth] : normal respiratory rhythm and effort [Exaggerated Use Of Accessory Muscles For Inspiration] : no accessory muscle use [Oriented To Time, Place, And Person] : oriented to person, place, and time [Affect] : the affect was normal [Mood] : the mood was normal [Not Anxious] : not anxious [Normal Station and Gait] : the gait and station were normal for the patient's age [No Focal Deficits] : no focal deficits [No Palpable Adenopathy] : no palpable adenopathy [FreeTextEntry1] : severe prolapse

## 2022-11-08 ENCOUNTER — APPOINTMENT (OUTPATIENT)
Dept: UROLOGY | Facility: CLINIC | Age: 82
End: 2022-11-08

## 2022-11-08 VITALS
WEIGHT: 145 LBS | DIASTOLIC BLOOD PRESSURE: 87 MMHG | OXYGEN SATURATION: 99 % | HEART RATE: 71 BPM | HEIGHT: 64 IN | TEMPERATURE: 98 F | BODY MASS INDEX: 24.75 KG/M2 | SYSTOLIC BLOOD PRESSURE: 164 MMHG

## 2022-11-08 DIAGNOSIS — E78.5 HYPERLIPIDEMIA, UNSPECIFIED: ICD-10-CM

## 2022-11-08 DIAGNOSIS — R35.1 NOCTURIA: ICD-10-CM

## 2022-11-08 DIAGNOSIS — N32.81 OVERACTIVE BLADDER: ICD-10-CM

## 2022-11-08 PROCEDURE — 51798 US URINE CAPACITY MEASURE: CPT

## 2022-11-08 PROCEDURE — 99215 OFFICE O/P EST HI 40 MIN: CPT

## 2022-11-08 RX ORDER — ATORVASTATIN CALCIUM 40 MG/1
40 TABLET, FILM COATED ORAL
Refills: 0 | Status: ACTIVE | COMMUNITY

## 2022-11-08 RX ORDER — MIRABEGRON 50 MG/1
50 TABLET, FILM COATED, EXTENDED RELEASE ORAL
Refills: 0 | Status: ACTIVE | COMMUNITY

## 2022-11-08 RX ORDER — CARVEDILOL 25 MG/1
25 TABLET, FILM COATED ORAL
Refills: 0 | Status: ACTIVE | COMMUNITY

## 2022-11-08 RX ORDER — GEMFIBROZIL 600 MG/1
600 TABLET, FILM COATED ORAL
Refills: 0 | Status: ACTIVE | COMMUNITY

## 2022-11-08 RX ORDER — AMLODIPINE BESYLATE 10 MG/1
10 TABLET ORAL
Refills: 0 | Status: ACTIVE | COMMUNITY

## 2022-11-08 RX ORDER — LOSARTAN POTASSIUM AND HYDROCHLOROTHIAZIDE 25; 100 MG/1; MG/1
100-25 TABLET ORAL
Refills: 0 | Status: ACTIVE | COMMUNITY

## 2022-11-08 NOTE — PHYSICAL EXAM
[General Appearance - Well Developed] : well developed [General Appearance - Well Nourished] : well nourished [Abdomen Soft] : soft [Abdomen Tenderness] : non-tender [Urethral Meatus] : the meatus of the urethra showed no abnormalities [FreeTextEntry1] : vulvar atrophy, gh: 3-4 cm, c: 0, Ba: -2 Bp: -1, PVR: 50

## 2022-11-08 NOTE — ASSESSMENT
[FreeTextEntry1] : Ms. 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

## 2022-11-08 NOTE — HISTORY OF PRESENT ILLNESS
[FreeTextEntry1] : 82F presents for initial evaluation of prolapse \par Referred by Dr. Herman  \par  \par PMH significant for: HTN \par PSH significant for: prolapse repair, TVH\par Significant meds: Myrbetriq \par \par Patient reports years-long history of prolapse symptoms \par Reports mild to moderate level of distress \par Pelvic Heaviness: No\par Vaginal Bulge: Yes\par Splinting: Yes, with BM\par Associated with: nothing else \par Previously treated with: TVH + SSLF + AR + MUS ('14, [place])\par History of Abnormal PAP: n/a\par Amenable to Hysterectomy: n/a\par \par Patient reports years-long history of nocturia\par Reports moderate level of distress \par Denies incontinence \par Previously treated with: Myrbetriq - unsure of efficacy\par \par Daytime Frequency: 6-8\par Nighttime Frequency: 2-3\par Pads per day: 1, overnight only\par Straining to void: No\par Subjective Incomplete Emptying: Yes\par \par Daily Fluid Total: unsure \par Daily Caffeine Total: 8 oz\par \par Fecal Incontinence: No\par Neurologic Hx, Vision or Balance changes: No\par \par

## 2022-11-17 ENCOUNTER — APPOINTMENT (OUTPATIENT)
Dept: UROLOGY | Facility: CLINIC | Age: 82
End: 2022-11-17

## 2022-11-17 VITALS
DIASTOLIC BLOOD PRESSURE: 75 MMHG | HEART RATE: 68 BPM | OXYGEN SATURATION: 98 % | TEMPERATURE: 97.8 F | HEIGHT: 64 IN | BODY MASS INDEX: 23.56 KG/M2 | SYSTOLIC BLOOD PRESSURE: 149 MMHG | WEIGHT: 138 LBS

## 2022-11-17 DIAGNOSIS — N81.9 FEMALE GENITAL PROLAPSE, UNSPECIFIED: ICD-10-CM

## 2022-11-17 PROCEDURE — 99215 OFFICE O/P EST HI 40 MIN: CPT

## 2022-11-17 NOTE — ASSESSMENT
[FreeTextEntry1] : Ms. 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 Patient felt the plastic with the #2 ring. #2-4 short gelhorn and cube attempted, all fell out. Made the shared decision to stop trying for today as patient was in distress.\par \par We again extensively discussed conservative and surgical options including:  \par \par       -Observation \par       -Pessary \par       -Transvaginal native tissue repair \par \par Recommendations\par -continue Myrbetriq\par -Patient to call office when ready. Will consider repeat attempt at pessary vs. surgical repair

## 2022-11-17 NOTE — PHYSICAL EXAM
[General Appearance - Well Developed] : well developed [General Appearance - Well Nourished] : well nourished [Abdomen Soft] : soft [Abdomen Tenderness] : non-tender [FreeTextEntry1] : attempted multiple pessary placements. patient tolerated, but all fell out

## 2022-11-17 NOTE — HISTORY OF PRESENT ILLNESS
[FreeTextEntry1] : 82F presents for initial evaluation of prolapse \par Referred by Dr. Herman  \par  \par PMH significant for: HTN \par PSH significant for: prolapse repair, TVH\par Significant meds: Myrbetriq \par \par Patient reports years-long history of prolapse symptoms \par Reports mild to moderate level of distress \par Pelvic Heaviness: No\par Vaginal Bulge: Yes\par Splinting: Yes, with BM\par Associated with: nothing else \par Previously treated with: TVH + SSLF + AR + MUS ('14, [place])\par History of Abnormal PAP: n/a\par Amenable to Hysterectomy: n/a\par \par Patient reports years-long history of nocturia\par Reports moderate level of distress \par Denies incontinence \par Previously treated with: Myrbetriq - unsure of efficacy\par \par Daytime Frequency: 6-8\par Nighttime Frequency: 2-3\par Pads per day: 1, overnight only\par Straining to void: No\par Subjective Incomplete Emptying: Yes\par \par Daily Fluid Total: unsure \par Daily Caffeine Total: 8 oz\par \par Fecal Incontinence: No\par Neurologic Hx, Vision or Balance changes: No\par \par